# Patient Record
Sex: MALE | Race: WHITE | Employment: UNEMPLOYED | URBAN - METROPOLITAN AREA
[De-identification: names, ages, dates, MRNs, and addresses within clinical notes are randomized per-mention and may not be internally consistent; named-entity substitution may affect disease eponyms.]

---

## 2020-09-24 ENCOUNTER — TELEPHONE (OUTPATIENT)
Dept: INTERNAL MEDICINE CLINIC | Age: 25
End: 2020-09-24

## 2020-09-24 NOTE — TELEPHONE ENCOUNTER
----- Message from Gianluca Courser sent at 9/24/2020  7:09 AM EDT -----  Regarding: Dr. Odalis Damon Message/Vendor Calls    Caller's first and last name:Lala Márquez(mother)      Reason for call:sick visit      Callback required yes/no and why:yes      Best contact number(s):[phone]      Details to clarify the request:Pt's mother requested an appt for today for strep throat. Pt will be  a new pt, and is not scheduled to come in until 12/10/20.       Gianluca Coursesalud

## 2020-12-17 ENCOUNTER — OFFICE VISIT (OUTPATIENT)
Dept: INTERNAL MEDICINE CLINIC | Age: 25
End: 2020-12-17
Payer: COMMERCIAL

## 2020-12-17 VITALS
TEMPERATURE: 98.1 F | OXYGEN SATURATION: 97 % | WEIGHT: 182 LBS | BODY MASS INDEX: 26.05 KG/M2 | DIASTOLIC BLOOD PRESSURE: 78 MMHG | HEIGHT: 70 IN | SYSTOLIC BLOOD PRESSURE: 134 MMHG | RESPIRATION RATE: 18 BRPM | HEART RATE: 85 BPM

## 2020-12-17 DIAGNOSIS — Z00.00 PREVENTATIVE HEALTH CARE: Primary | ICD-10-CM

## 2020-12-17 PROCEDURE — 99385 PREV VISIT NEW AGE 18-39: CPT | Performed by: INTERNAL MEDICINE

## 2020-12-17 NOTE — PROGRESS NOTES
HISTORY OF PRESENT ILLNESS    New patient to my practice, referred to me by his father, Rosibel Mason. .   Prior medical care has been from Dr. Coni Mccoy in pediatrics. his  past medical history was reviewed, discussed, and summarized in the list below. He is graduating from [employer] this month. 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 He is doing well and has no concerns. History of recurrent strep infection infections, requiring antibiotics. Says last episode was 3 months ago. He does have a history of tonsillectomy. Says that he has an allergy to cefuroxime but is unclear if he takes amoxicillin or has an allergy to that as well. It appears he was given Bactrim and azithromycin for infections over the past couple of years. He will check on penicillin allergy status. He does have an EpiPen. No other food allergies. He believes is up-to-date on immunizations. He traveled abroad in the past couple of years and just finished college. Says he had Gardasil vaccine. Says he had STD labs performed for screening at school last month and they were all normal.  Was asymptomatic and has never had an STD. Admits that he drinks 8-10 beers 3 days/week. Review of Systems   All other systems reviewed and are negative, except as noted in HPI      Past Medical and Surgical History  Past Medical History:   Diagnosis Date    Hemiplegic migraine 2013    L tongue/arm/leg pain, numbness.  Hx of streptococcal pharyngitis     Lower back pain     saw ortho Dr. Argenis Regalado 1/2018. transient L leg radicular pain. resolved w PT.      Migraine     rare migraines. one epsidoe of hemiplegic migraine        has a past surgical history that includes hx tonsillectomy. No current outpatient medications on file. No current facility-administered medications for this visit. reports that he has never smoked. He has never used smokeless tobacco. He reports current alcohol use.  He reports that he does not use drugs. family history is not on file. Physical Exam   Nursing note and vitals reviewed. Blood pressure 134/78, pulse 85, temperature 98.1 °F (36.7 °C), temperature source Oral, resp. rate 18, height 5' 10\" (1.778 m), weight 182 lb (82.6 kg), SpO2 97 %. Constitutional: oriented to person, place, and time. No distress. Eyes: Conjunctivae are normal.   HEENT:  No cervical lymphadenopathy. No thyroid nodules or goiter  Cardiovascular: Normal rate. Regular rhythm, no murmurs, rubs. No edema  Pulmonary/Chest: Effort normal. clear to auscultation  Abdominal: soft, non-tender, non-distended  Musculoskeletal:     Neurological: Alert and oriented to person, place. Cranial nerves grossly intact. Normal gait   Skin: No rash noted. Psychiatric: Normal mood and affect. Behavior is normal.     ASSESSMENT and PLAN  Diagnoses and all orders for this visit:    1. Preventative health care  Appears to be a relatively healthy 23year-old. We will get some baseline labs. Get immunization records. Counseled on reduction of alcohol intake. Wished good luck with the applications for new jobs. Counseled on safe sexual practices  -     LIPID PANEL; Future  -     CBC W/O DIFF; Future  -     METABOLIC PANEL, COMPREHENSIVE;  Future    lab results and schedule of future lab studies reviewed with patient  reviewed medications and side effects in detail

## 2020-12-18 LAB
ALBUMIN SERPL-MCNC: 5 G/DL (ref 3.5–5)
ALBUMIN/GLOB SERPL: 1.6 {RATIO} (ref 1.1–2.2)
ALP SERPL-CCNC: 92 U/L (ref 45–117)
ALT SERPL-CCNC: 49 U/L (ref 12–78)
ANION GAP SERPL CALC-SCNC: 6 MMOL/L (ref 5–15)
AST SERPL-CCNC: 23 U/L (ref 15–37)
BILIRUB SERPL-MCNC: 0.5 MG/DL (ref 0.2–1)
BUN SERPL-MCNC: 13 MG/DL (ref 6–20)
BUN/CREAT SERPL: 15 (ref 12–20)
CALCIUM SERPL-MCNC: 9.7 MG/DL (ref 8.5–10.1)
CHLORIDE SERPL-SCNC: 107 MMOL/L (ref 97–108)
CHOLEST SERPL-MCNC: 190 MG/DL
CO2 SERPL-SCNC: 25 MMOL/L (ref 21–32)
CREAT SERPL-MCNC: 0.86 MG/DL (ref 0.7–1.3)
ERYTHROCYTE [DISTWIDTH] IN BLOOD BY AUTOMATED COUNT: 12.8 % (ref 11.5–14.5)
GLOBULIN SER CALC-MCNC: 3.2 G/DL (ref 2–4)
GLUCOSE SERPL-MCNC: 83 MG/DL (ref 65–100)
HCT VFR BLD AUTO: 47.8 % (ref 36.6–50.3)
HDLC SERPL-MCNC: 57 MG/DL
HDLC SERPL: 3.3 {RATIO} (ref 0–5)
HGB BLD-MCNC: 15.8 G/DL (ref 12.1–17)
LDLC SERPL CALC-MCNC: 120.4 MG/DL (ref 0–100)
LIPID PROFILE,FLP: ABNORMAL
MCH RBC QN AUTO: 29.8 PG (ref 26–34)
MCHC RBC AUTO-ENTMCNC: 33.1 G/DL (ref 30–36.5)
MCV RBC AUTO: 90.2 FL (ref 80–99)
NRBC # BLD: 0 K/UL (ref 0–0.01)
NRBC BLD-RTO: 0 PER 100 WBC
PLATELET # BLD AUTO: 222 K/UL (ref 150–400)
PMV BLD AUTO: 10.5 FL (ref 8.9–12.9)
POTASSIUM SERPL-SCNC: 4.8 MMOL/L (ref 3.5–5.1)
PROT SERPL-MCNC: 8.2 G/DL (ref 6.4–8.2)
RBC # BLD AUTO: 5.3 M/UL (ref 4.1–5.7)
SODIUM SERPL-SCNC: 138 MMOL/L (ref 136–145)
TRIGL SERPL-MCNC: 63 MG/DL (ref ?–150)
VLDLC SERPL CALC-MCNC: 12.6 MG/DL
WBC # BLD AUTO: 5.7 K/UL (ref 4.1–11.1)

## 2022-02-24 ENCOUNTER — OFFICE VISIT (OUTPATIENT)
Dept: INTERNAL MEDICINE CLINIC | Age: 27
End: 2022-02-24
Payer: COMMERCIAL

## 2022-02-24 VITALS
HEART RATE: 84 BPM | TEMPERATURE: 98.6 F | OXYGEN SATURATION: 98 % | SYSTOLIC BLOOD PRESSURE: 112 MMHG | HEIGHT: 70 IN | WEIGHT: 199 LBS | RESPIRATION RATE: 16 BRPM | DIASTOLIC BLOOD PRESSURE: 73 MMHG | BODY MASS INDEX: 28.49 KG/M2

## 2022-02-24 DIAGNOSIS — M65.9 FLEXOR TENOSYNOVITIS OF THUMB: Primary | ICD-10-CM

## 2022-02-24 PROCEDURE — 99213 OFFICE O/P EST LOW 20 MIN: CPT | Performed by: INTERNAL MEDICINE

## 2022-02-24 RX ORDER — NAPROXEN SODIUM 220 MG
440 TABLET ORAL 2 TIMES DAILY WITH MEALS
Qty: 56 TABLET | Refills: 0
Start: 2022-02-24 | End: 2022-03-10

## 2022-02-24 NOTE — PROGRESS NOTES
HISTORY OF PRESENT ILLNESS    Chief Complaint   Patient presents with    Hand Pain     Left - when bending thumb - about 1 month - unaware of any causes. Doing well. 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 Lives with his girlfriend of 3 years. Presents with pain of left palm at base of thumb for 30 days. Associated symptoms include: pain with flexion of thumb. No injury. Worse w weight lifting and ry . No change in activity. No prior injury. No tx tried. Still able to do everything. Review of Systems   All other systems reviewed and are negative, except as noted in HPI    Past Medical and Surgical History   has a past medical history of Hemiplegic migraine (2013), streptococcal pharyngitis, Lower back pain, and Migraine. has a past surgical history that includes hx tonsillectomy. reports that he has never smoked. He has never used smokeless tobacco. He reports current alcohol use. He reports that he does not use drugs. family history is not on file. Physical Exam   Nursing note and vitals reviewed. Blood pressure 112/73, pulse 84, temperature 98.6 °F (37 °C), temperature source Oral, resp. rate 16, height 5' 10\" (1.778 m), weight 199 lb (90.3 kg), SpO2 98 %. Constitutional: In no distress. Eyes: Conjunctivae are normal.  HEENT:  No LAD or thyromegaly   Cardiovascular: Normal rate. regular rhythm. No murmurs  No edema  Pulmonary/Chest: Effort normal.   Musculoskeletal:  no edema. Left thumb full range of motion. There are some tenderness at the base of his thenar eminence. No palpable abnormalities. No cysts. No erythema or warmth. Psychiatric: Normal mood and affect. Behavior is normal.     ASSESSMENT and PLAN  1. Flexor tenosynovitis of thumb  Thenar muscle. Recommend Aleve 440 mg twice daily for 7 days and purchase a thumb brace to wear most of the time for the next 2 weeks.   Try to avoid repetitive activities or severe pressure on the area.  Consider referral to orthopedics for injection back likely will be unnecessary. Reassured. I do not see any indication for x-ray at this point. lab results and schedule of future lab studies reviewed with patient  reviewed medications and side effects in detail    Return to clinic for further evaluation if new symptoms develop or if current symptoms worsen or fail to resolve. There are no Patient Instructions on file for this visit.

## 2022-03-31 ENCOUNTER — TELEPHONE (OUTPATIENT)
Dept: INTERNAL MEDICINE CLINIC | Age: 27
End: 2022-03-31

## 2022-03-31 NOTE — TELEPHONE ENCOUNTER
Spoke with patient and he continues to voice concerns with his urinary frequency. Patient scheduled appt for 4/6/22 with Dr. Jie Daniels for evaluation and treatment. Grateful for the call.

## 2022-03-31 NOTE — TELEPHONE ENCOUNTER
----- Message from Vikram Winters sent at 3/31/2022  9:01 AM EDT -----  Regarding: Peeing Too Much  So I tracked exactly how much I drink and peed yesterday.     Morning  Drinks: 3 10 oz  Peed: 4     Afternoon  Drinks: 1 16oz 1 16.9oz  Peed: 3    Evening  Drinks: 1 30oz 2 12oz  Peed: 3    Night  Drinks: 1 20oz  Peed 2    Early Morning  Peed: 2

## 2022-04-06 ENCOUNTER — OFFICE VISIT (OUTPATIENT)
Dept: INTERNAL MEDICINE CLINIC | Age: 27
End: 2022-04-06
Payer: COMMERCIAL

## 2022-04-06 VITALS
OXYGEN SATURATION: 98 % | HEIGHT: 70 IN | DIASTOLIC BLOOD PRESSURE: 73 MMHG | TEMPERATURE: 98.1 F | BODY MASS INDEX: 28.95 KG/M2 | WEIGHT: 202.2 LBS | HEART RATE: 80 BPM | RESPIRATION RATE: 16 BRPM | SYSTOLIC BLOOD PRESSURE: 121 MMHG

## 2022-04-06 DIAGNOSIS — R35.0 URINARY FREQUENCY: Primary | ICD-10-CM

## 2022-04-06 DIAGNOSIS — J03.01 RECURRENT STREPTOCOCCAL TONSILLITIS: ICD-10-CM

## 2022-04-06 LAB
BILIRUB UR QL STRIP: NEGATIVE
GLUCOSE UR-MCNC: NEGATIVE MG/DL
KETONES P FAST UR STRIP-MCNC: NEGATIVE MG/DL
PH UR STRIP: 6.5 [PH] (ref 4.6–8)
PROT UR QL STRIP: NEGATIVE
SP GR UR STRIP: 1.02 (ref 1–1.03)
UA UROBILINOGEN AMB POC: NORMAL (ref 0.2–1)
URINALYSIS CLARITY POC: CLEAR
URINALYSIS COLOR POC: YELLOW
URINE BLOOD POC: NEGATIVE
URINE LEUKOCYTES POC: NEGATIVE
URINE NITRITES POC: NEGATIVE

## 2022-04-06 PROCEDURE — 99213 OFFICE O/P EST LOW 20 MIN: CPT | Performed by: INTERNAL MEDICINE

## 2022-04-06 PROCEDURE — 81003 URINALYSIS AUTO W/O SCOPE: CPT | Performed by: INTERNAL MEDICINE

## 2022-04-06 NOTE — PROGRESS NOTES
HISTORY OF PRESENT ILLNESS    Chief Complaint   Patient presents with    Urinary Frequency       Presents for follow-up. Doing fairly well. Working in [employer]. Reports frequent urination. This is chronic, noted years ago. 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 He has to use the bathroom more than anyone else. Notes pressure to urinate. No incontinence. Urinates about once per night overnight. Feels he empties bladder. Urinary stream is full  Rare dysuria. No discharge. Review of Systems   All other systems reviewed and are negative, except as noted in HPI    Past Medical and Surgical History   has a past medical history of Hemiplegic migraine (2013), streptococcal pharyngitis, Lower back pain, and Migraine. has a past surgical history that includes hx tonsillectomy. reports that he has never smoked. He has never used smokeless tobacco. He reports current alcohol use. He reports that he does not use drugs. family history is not on file. Physical Exam   Nursing note and vitals reviewed. Blood pressure 121/73, pulse 80, temperature 98.1 °F (36.7 °C), temperature source Oral, resp. rate 16, height 5' 10\" (1.778 m), weight 202 lb 3.2 oz (91.7 kg), SpO2 98 %. Constitutional:  No distress. Eyes: Conjunctivae are normal.   Ears:  Hearing grossly intact  Cardiovascular: Normal rate. regular rhythm, no murmurs or gallops  No edema  Pulmonary/Chest: Effort normal.   CTAB  Musculoskeletal: moves all 4 extremities   Neurological: Alert and oriented to person, place, and time. Skin: No visible rash noted. Psychiatric: Normal mood and affect. Behavior is normal.     Assessment and Plan    Diagnoses and all orders for this visit:    1. Urinary frequency  This does sound relatively benign and has chronic. I suspect he just has a limited bladder capacity. We will check for any evidence of diabetes, hyponatremia and get a baseline PSA. Urinalysis today is benign.   Try to limit spicy foods, caffeine, tea, coffee, alcohol and excessive fluids.  -     AMB POC URINALYSIS DIP STICK AUTO W/O MICRO  -     METABOLIC PANEL, BASIC; Future  -     PSA W/ REFLX FREE PSA; Future  -     REFERRAL TO UROLOGY    2. Recurrent streptococcal pharyngitis  Reports that he has strep pharyngitis a couple of times per years. History of tonsil resection. Can refer to ENT if you would like, but nothing therapy especially helpful right now. Let me know if symptoms recur. lab results and schedule of future lab studies reviewed with patient  reviewed medications and side effects in detail    Return to clinic for further evaluation if new symptoms develop        No current outpatient medications on file. No current facility-administered medications for this visit.

## 2022-04-07 LAB
BUN SERPL-MCNC: 15 MG/DL (ref 6–20)
BUN/CREAT SERPL: 18 (ref 9–20)
CALCIUM SERPL-MCNC: 9.5 MG/DL (ref 8.7–10.2)
CHLORIDE SERPL-SCNC: 103 MMOL/L (ref 96–106)
CO2 SERPL-SCNC: 21 MMOL/L (ref 20–29)
CREAT SERPL-MCNC: 0.85 MG/DL (ref 0.76–1.27)
EGFR: 124 ML/MIN/1.73
GLUCOSE SERPL-MCNC: 87 MG/DL (ref 65–99)
POTASSIUM SERPL-SCNC: 4.7 MMOL/L (ref 3.5–5.2)
PSA SERPL-MCNC: 0.5 NG/ML (ref 0–4)
REFLEX CRITERIA: NORMAL
SODIUM SERPL-SCNC: 140 MMOL/L (ref 134–144)

## 2023-03-02 RX ORDER — DOXYCYCLINE 100 MG/1
100 CAPSULE ORAL 2 TIMES DAILY
Qty: 14 CAPSULE | Refills: 0 | Status: SHIPPED | OUTPATIENT
Start: 2023-03-02

## 2023-03-30 ENCOUNTER — OFFICE VISIT (OUTPATIENT)
Dept: INTERNAL MEDICINE CLINIC | Age: 28
End: 2023-03-30
Payer: COMMERCIAL

## 2023-03-30 VITALS
SYSTOLIC BLOOD PRESSURE: 107 MMHG | OXYGEN SATURATION: 97 % | TEMPERATURE: 98.1 F | HEART RATE: 75 BPM | WEIGHT: 196.7 LBS | DIASTOLIC BLOOD PRESSURE: 79 MMHG | HEIGHT: 70 IN | RESPIRATION RATE: 16 BRPM | BODY MASS INDEX: 28.16 KG/M2

## 2023-03-30 DIAGNOSIS — J02.0 STREPTOCOCCAL PHARYNGITIS: Primary | ICD-10-CM

## 2023-03-30 LAB
S PYO AG THROAT QL: POSITIVE
VALID INTERNAL CONTROL?: YES

## 2023-03-30 PROCEDURE — 87880 STREP A ASSAY W/OPTIC: CPT | Performed by: NURSE PRACTITIONER

## 2023-03-30 PROCEDURE — 99203 OFFICE O/P NEW LOW 30 MIN: CPT | Performed by: NURSE PRACTITIONER

## 2023-03-30 RX ORDER — ONDANSETRON 4 MG/1
4 TABLET, FILM COATED ORAL
Qty: 21 TABLET | Refills: 0 | Status: SHIPPED | OUTPATIENT
Start: 2023-03-30 | End: 2023-04-06

## 2023-03-30 RX ORDER — AMOXICILLIN AND CLAVULANATE POTASSIUM 500; 125 MG/1; MG/1
1 TABLET, FILM COATED ORAL 3 TIMES DAILY
Qty: 30 TABLET | Refills: 0 | Status: SHIPPED | OUTPATIENT
Start: 2023-03-30 | End: 2023-04-09

## 2023-03-30 NOTE — PROGRESS NOTES
Assessment and Plan     1. Streptococcal pharyngitis: Positive in office strep test, results discussed with pt. Treatment started, mode of use and possible side effects discussed. Rx for ondansetron as needed for nausea/vomiting given. Hydration  and rest discussed. Dx transmission prevention discussed. Pt verbalized understanding  -     AMB POC RAPID STREP A  -     amoxicillin-clavulanate (AUGMENTIN) 500-125 mg per tablet; Take 1 Tablet by mouth three (3) times daily for 10 days. , Normal, Disp-30 Tablet, R-0  -     ondansetron hcl (ZOFRAN) 4 mg tablet; Take 1 Tablet by mouth every eight (8) hours as needed for Nausea or Vomiting for up to 7 days. , Normal, Disp-21 Tablet, R-0    Tests Preformed During Visit:    Results for orders placed or performed in visit on 03/30/23   AMB POC RAPID STREP A   Result Value Ref Range    VALID INTERNAL CONTROL POC Yes     Group A Strep Ag Positive Negative       Benefits, risks, possible drug interactions, and side effects of all new medications were reviewed with the patient. Pt verbalized understanding. Return to clinic:    Follow-up and Dispositions    Return if symptoms worsen or fail to improve. An electronic signature was used to authenticate this note. Collin Wynn, [address] [facility]  [facility] [id number]  3/30/2023      No future appointments. History of Present Illness   Chief Complaint     Hawk Sharp is a 24 y.o. male here for had concerns including Sore Throat (Sore throat; started around 3 days ago; nausea). Sore throat: Pt presented today with complaints of sore throat and nausea, onset 3 days ago, symptoms worsening  since yesterday with body aches. Denies vomiting, fever, changes in BM. Fiance with similar symptoms. Pt has hx of strep infection. Surgical hx includes tonsillectomy.      Review of Systems  Review of Systems   Constitutional:  Positive for malaise/fatigue (slight). Negative for chills and fever. HENT:  Positive for sore throat. Negative for congestion, ear discharge, ear pain, nosebleeds and sinus pain. Respiratory: Negative. Negative for cough, sputum production, shortness of breath and wheezing. Cardiovascular: Negative. Negative for chest pain. Gastrointestinal: Negative. Negative for abdominal pain, constipation, diarrhea, nausea and vomiting. Genitourinary: Negative. Negative for dysuria, frequency, hematuria and urgency. Musculoskeletal: Negative. Negative for myalgias. Neurological: Negative. Negative for dizziness and headaches. Psychiatric/Behavioral: Negative. Negative for depression, substance abuse and suicidal ideas. The patient is not nervous/anxious. Past Medical History     Allergies   Allergen Reactions    Cefuroxime Anaphylaxis and Swelling        Current Outpatient Medications   Medication Sig    amoxicillin-clavulanate (AUGMENTIN) 500-125 mg per tablet Take 1 Tablet by mouth three (3) times daily for 10 days. ondansetron hcl (ZOFRAN) 4 mg tablet Take 1 Tablet by mouth every eight (8) hours as needed for Nausea or Vomiting for up to 7 days. No current facility-administered medications for this visit. Patient Active Problem List   Diagnosis Code    Migraine G43.909    Lower back pain M54.50     Past Surgical History:   Procedure Laterality Date    HX TONSILLECTOMY        Social History     Tobacco Use    Smoking status: Never    Smokeless tobacco: Never   Substance Use Topics    Alcohol use: Yes      History reviewed. No pertinent family history. Physical Exam   Vitals:       Visit Vitals  /79 (BP 1 Location: Left upper arm, BP Patient Position: Sitting, BP Cuff Size: Adult)   Pulse 75   Temp 98.1 °F (36.7 °C) (Oral)   Resp 16   Ht 5' 10\" (1.778 m)   Wt 196 lb 11.2 oz (89.2 kg)   SpO2 97%   BMI 28.22 kg/m²        Physical Exam  Vitals reviewed.    Constitutional: General: He is not in acute distress. Appearance: He is ill-appearing. He is not toxic-appearing or diaphoretic. HENT:      Right Ear: Tympanic membrane, ear canal and external ear normal. There is no impacted cerumen. Left Ear: Tympanic membrane, ear canal and external ear normal. There is no impacted cerumen. Nose: Nose normal. No congestion or rhinorrhea. Mouth/Throat:      Lips: Pink. No lesions. Mouth: Mucous membranes are moist. No oral lesions or angioedema. Tongue: No lesions. Tongue does not deviate from midline. Pharynx: Posterior oropharyngeal erythema present. No pharyngeal swelling, oropharyngeal exudate or uvula swelling. Eyes:      General:         Right eye: No discharge. Left eye: No discharge. Conjunctiva/sclera: Conjunctivae normal.   Neck:      Vascular: No carotid bruit. Cardiovascular:      Rate and Rhythm: Normal rate and regular rhythm. Pulses: Normal pulses. Heart sounds: Normal heart sounds. Pulmonary:      Effort: Pulmonary effort is normal. No respiratory distress. Breath sounds: Normal breath sounds. No wheezing or rales. Abdominal:      General: Bowel sounds are normal. There is no distension. Palpations: Abdomen is soft. There is no mass. Tenderness: There is no abdominal tenderness. There is no guarding. Hernia: No hernia is present. Musculoskeletal:      Cervical back: No rigidity or tenderness. Lymphadenopathy:      Cervical: Cervical adenopathy present. Skin:     General: Skin is warm and dry. Neurological:      Mental Status: He is alert and oriented to person, place, and time. Mental status is at baseline.    Psychiatric:         Mood and Affect: Mood normal.         Behavior: Behavior normal.

## 2023-08-28 ENCOUNTER — OFFICE VISIT (OUTPATIENT)
Age: 28
End: 2023-08-28

## 2023-08-28 VITALS
DIASTOLIC BLOOD PRESSURE: 93 MMHG | SYSTOLIC BLOOD PRESSURE: 126 MMHG | WEIGHT: 193 LBS | OXYGEN SATURATION: 97 % | TEMPERATURE: 98.2 F | HEIGHT: 71 IN | HEART RATE: 61 BPM | RESPIRATION RATE: 18 BRPM | BODY MASS INDEX: 27.02 KG/M2

## 2023-08-28 DIAGNOSIS — H66.93 BILATERAL OTITIS MEDIA, UNSPECIFIED OTITIS MEDIA TYPE: Primary | ICD-10-CM

## 2023-08-28 RX ORDER — AMOXICILLIN 500 MG/1
500 CAPSULE ORAL 2 TIMES DAILY
Qty: 20 CAPSULE | Refills: 0 | Status: SHIPPED | OUTPATIENT
Start: 2023-08-28 | End: 2023-09-07

## 2023-08-28 ASSESSMENT — ENCOUNTER SYMPTOMS
RESPIRATORY NEGATIVE: 1
GASTROINTESTINAL NEGATIVE: 1
ALLERGIC/IMMUNOLOGIC NEGATIVE: 1
EYES NEGATIVE: 1

## 2023-08-28 NOTE — PROGRESS NOTES
2023   Raven Espinosa (: 1995) is a 34 y.o. male, New patient, here for evaluation of the following chief complaint(s):  Otalgia (Pt thinks he has ear infection, started this morning, painful feels water.)     ASSESSMENT/PLAN:  Below is the assessment and plan developed based on review of pertinent history, physical exam, labs, studies, and medications. 1. Bilateral otitis media, unspecified otitis media type  -     amoxicillin (AMOXIL) 500 MG capsule; Take 1 capsule by mouth 2 times daily for 10 days, Disp-20 capsule, R-0Normal         Handout given with care instructions  2. OTC for symptom management. Increase fluid intake, ensure adequate nutritional intake. 3. Follow up with PCP as needed. 4. Go to ED with development of any acute symptoms. Follow up:  Return if symptoms worsen or fail to improve. Follow up immediately for any new, worsening or changes or if symptoms are not improving over the next 5-7 days. SUBJECTIVE/OBJECTIVE:    Otalgia        Diagnoses and all orders for this visit:  Bilateral otitis media, unspecified otitis media type  Raven Espinosa is a 34 y.o. male who complains of bilateral ear pain for 1 days. He denies a history of fevers and denies a history of asthma. Patient denies smoke cigarettes. Patient also noted that OTC remedies did not help. PROGRESSION: STABLE  SYMPTOMS        No results found for any visits on 23. No results found for this visit on 23. XR Results (most recent):  [id number]@         Review of Systems   Constitutional: Negative. HENT:  Positive for ear pain. Eyes: Negative. Respiratory: Negative. Cardiovascular: Negative. Gastrointestinal: Negative. Endocrine: Negative. Genitourinary: Negative. Musculoskeletal: Negative. Skin: Negative. Allergic/Immunologic: Negative. Neurological: Negative. Hematological: Negative. Psychiatric/Behavioral: Negative.      All other systems

## 2023-11-28 ENCOUNTER — OFFICE VISIT (OUTPATIENT)
Age: 28
End: 2023-11-28

## 2023-11-28 VITALS
SYSTOLIC BLOOD PRESSURE: 124 MMHG | TEMPERATURE: 98.3 F | HEIGHT: 71 IN | OXYGEN SATURATION: 98 % | WEIGHT: 188 LBS | BODY MASS INDEX: 26.32 KG/M2 | RESPIRATION RATE: 18 BRPM | HEART RATE: 70 BPM | DIASTOLIC BLOOD PRESSURE: 80 MMHG

## 2023-11-28 DIAGNOSIS — J02.9 ACUTE VIRAL PHARYNGITIS: Primary | ICD-10-CM

## 2023-11-28 DIAGNOSIS — B37.2 INTERTRIGO OF GENITOCRURAL REGION DUE TO CANDIDA SPECIES: ICD-10-CM

## 2023-11-28 LAB
STREP PYOGENES DNA, POC: NEGATIVE
VALID INTERNAL CONTROL, POC: YES

## 2023-11-28 RX ORDER — KETOCONAZOLE 20 MG/G
CREAM TOPICAL
Qty: 30 G | Refills: 1 | Status: SHIPPED | OUTPATIENT
Start: 2023-11-28

## 2023-11-28 NOTE — PROGRESS NOTES
Head: Normocephalic and atraumatic. Right Ear: Tympanic membrane, ear canal and external ear normal.      Left Ear: Tympanic membrane, ear canal and external ear normal.      Nose: Congestion present. No nasal tenderness, mucosal edema or rhinorrhea. Right Sinus: No maxillary sinus tenderness or frontal sinus tenderness. Left Sinus: No maxillary sinus tenderness or frontal sinus tenderness. Mouth/Throat:      Lips: Pink. Mouth: Mucous membranes are moist.      Pharynx: Oropharynx is clear. Uvula midline. Posterior oropharyngeal erythema (Mild with PND) present. No pharyngeal swelling, oropharyngeal exudate or uvula swelling. Tonsils: No tonsillar exudate or tonsillar abscesses. Eyes:      Conjunctiva/sclera: Conjunctivae normal.   Cardiovascular:      Rate and Rhythm: Normal rate and regular rhythm. Heart sounds: Normal heart sounds. Pulmonary:      Effort: Pulmonary effort is normal.      Breath sounds: Normal breath sounds. Musculoskeletal:      Cervical back: Normal range of motion and neck supple. Lymphadenopathy:      Cervical: No cervical adenopathy. Skin:     General: Skin is warm and dry. Findings: Erythema and rash present. Neurological:      Mental Status: He is alert and oriented to person, place, and time. Psychiatric:         Mood and Affect: Mood normal.         Behavior: Behavior normal.       Results for orders placed or performed in visit on 11/28/23   AMB POC STREP GO A DIRECT, DNA PROBE   Result Value Ref Range    Valid Internal Control, POC Yes     Strep pyogenes DNA, POC Negative Negative         An electronic signature was used to authenticate this note.     --Tianna Ruano, DENISSE - CNP

## 2024-02-16 DIAGNOSIS — B37.2 INTERTRIGO OF GENITOCRURAL REGION DUE TO CANDIDA SPECIES: ICD-10-CM

## 2024-02-18 RX ORDER — KETOCONAZOLE 20 MG/G
CREAM TOPICAL
Qty: 30 G | Refills: 1 | OUTPATIENT
Start: 2024-02-18

## 2024-02-21 ENCOUNTER — E-VISIT (OUTPATIENT)
Age: 29
End: 2024-02-21

## 2024-02-21 DIAGNOSIS — K12.0 CANKER SORES ORAL: Primary | ICD-10-CM

## 2024-02-21 PROCEDURE — 99421 OL DIG E/M SVC 5-10 MIN: CPT | Performed by: INTERNAL MEDICINE

## 2024-02-21 RX ORDER — TRIAMCINOLONE ACETONIDE 0.1 %
PASTE (GRAM) DENTAL
Qty: 5 G | Refills: 2 | Status: SHIPPED | OUTPATIENT
Start: 2024-02-21 | End: 2024-02-28

## 2024-02-21 NOTE — PROGRESS NOTES
Fan Jones (1995) initiated an asynchronous digital communication through [employer].    HPI: per patient questionnaire     Exam: not applicable    Diagnoses and all orders for this visit:  Diagnoses and all orders for this visit:    Canker sores oral  -     triamcinolone acetonide (KENALOG) 0.1 % paste; Apply to canker sore 2 times daily.for 7 days          Time: EV1 - 5-10 minutes were spent on the digital evaluation and management of this patient.    Beto Reyez MD

## 2024-03-26 ENCOUNTER — OFFICE VISIT (OUTPATIENT)
Age: 29
End: 2024-03-26

## 2024-03-26 VITALS
BODY MASS INDEX: 26.18 KG/M2 | WEIGHT: 187 LBS | SYSTOLIC BLOOD PRESSURE: 126 MMHG | OXYGEN SATURATION: 98 % | HEART RATE: 62 BPM | DIASTOLIC BLOOD PRESSURE: 74 MMHG | TEMPERATURE: 98.1 F | HEIGHT: 71 IN

## 2024-03-26 DIAGNOSIS — J45.20 MILD INTERMITTENT ASTHMA WITHOUT COMPLICATION: Primary | ICD-10-CM

## 2024-03-26 DIAGNOSIS — R05.9 COUGH, UNSPECIFIED TYPE: ICD-10-CM

## 2024-03-26 LAB
Lab: NORMAL
PERFORMING INSTRUMENT: NORMAL
QC PASS/FAIL: NORMAL
SARS-COV-2, POC: NORMAL

## 2024-03-26 RX ORDER — ALBUTEROL SULFATE 90 UG/1
2 AEROSOL, METERED RESPIRATORY (INHALATION) 4 TIMES DAILY PRN
Qty: 18 G | Refills: 0 | Status: SHIPPED | OUTPATIENT
Start: 2024-03-26

## 2024-03-26 RX ORDER — METHYLPREDNISOLONE 4 MG/1
4 TABLET ORAL SEE ADMIN INSTRUCTIONS
Qty: 1 KIT | Refills: 0 | Status: SHIPPED | OUTPATIENT
Start: 2024-03-26

## 2024-03-26 RX ORDER — KETOCONAZOLE 20 MG/G
2 CREAM TOPICAL DAILY PRN
COMMUNITY
Start: 2024-02-21

## 2024-03-26 RX ORDER — IPRATROPIUM BROMIDE AND ALBUTEROL SULFATE 2.5; .5 MG/3ML; MG/3ML
1 SOLUTION RESPIRATORY (INHALATION) ONCE
Status: COMPLETED | OUTPATIENT
Start: 2024-03-26 | End: 2024-03-26

## 2024-03-26 RX ADMIN — IPRATROPIUM BROMIDE AND ALBUTEROL SULFATE 1 DOSE: 2.5; .5 SOLUTION RESPIRATORY (INHALATION) at 10:08

## 2024-03-26 ASSESSMENT — ENCOUNTER SYMPTOMS
ALLERGIC/IMMUNOLOGIC NEGATIVE: 1
RHINORRHEA: 1
WHEEZING: 1
COUGH: 1
GASTROINTESTINAL NEGATIVE: 1
SHORTNESS OF BREATH: 0
EYES NEGATIVE: 1
CHEST TIGHTNESS: 0

## 2024-03-26 NOTE — PROGRESS NOTES
3/26/2024   Fan Jones (: 1995) is a 28 y.o. male, New patient, here for evaluation of the following chief complaint(s):  Shortness of Breath (4-5 days of sx. Always feels out of breath, chest tightness. Denies cough, mucus, or pain when breathing. Took OTC Flonase. Only change is has been running more. It is consistent. Can catch breath, just always feels like he's slightly out of O2. )     ASSESSMENT/PLAN:  Below is the assessment and plan developed based on review of pertinent history, physical exam, labs, studies, and medications.  1. Mild intermittent asthma without complication  -     ipratropium 0.5 mg-albuterol 2.5 mg (DUONEB) nebulizer solution 1 Dose; 1 Dose, Inhalation, ONCE, 1 dose, On Tue 3/26/24 at 1015Initiate RT Bronchodilator Protocol: No  -     methylPREDNISolone (MEDROL, ALIREZA,) 4 MG tablet; Take 1 tablet by mouth See Admin Instructions Take by mouth., Disp-1 kit, R-0Normal  -     albuterol sulfate HFA (VENTOLIN HFA) 108 (90 Base) MCG/ACT inhaler; Inhale 2 puffs into the lungs 4 times daily as needed for Wheezing, Disp-18 g, R-0Normal  2. Cough, unspecified type  -     POCT COVID-19, Antigen  -     XR CHEST PA/LAT; Future         Handout given with care instructions  2. OTC for symptom management. Increase fluid intake, ensure adequate nutritional intake.  3. Follow up with PCP as needed.  4. Go to ED with development of any acute symptoms.     Follow up:  Return if symptoms worsen or fail to improve.  Follow up immediately for any new, worsening or changes or if symptoms are not improving over the next 5-7 days.     SUBJECTIVE/OBJECTIVE:    Shortness of Breath  Associated symptoms include rhinorrhea and wheezing.        Diagnoses and all orders for this visit:  Mild intermittent asthma without complication  Shortness of Breath (4-5 days of sx. Always feels out of breath, chest tightness. Denies cough, mucus, or pain when breathing. Took OTC Flonase. Only change is has been running more.

## 2024-04-15 ENCOUNTER — PATIENT MESSAGE (OUTPATIENT)
Age: 29
End: 2024-04-15

## 2024-04-16 RX ORDER — KETOCONAZOLE 20 MG/G
CREAM TOPICAL
Qty: 30 G | Refills: 0 | Status: SHIPPED | OUTPATIENT
Start: 2024-04-16

## 2024-04-16 NOTE — TELEPHONE ENCOUNTER
From: Fan Jones  To: Anh Krishna  Sent: 4/15/2024 4:41 PM EDT  Subject: ketoconazole 2 % cream    Can I get a refill of the ketoconazole 2 % cream?

## 2024-04-22 DIAGNOSIS — J45.20 MILD INTERMITTENT ASTHMA WITHOUT COMPLICATION: ICD-10-CM

## 2024-04-23 RX ORDER — ALBUTEROL SULFATE 90 UG/1
2 AEROSOL, METERED RESPIRATORY (INHALATION) 4 TIMES DAILY PRN
Qty: 8.5 EACH | OUTPATIENT
Start: 2024-04-23

## 2024-06-05 ENCOUNTER — OFFICE VISIT (OUTPATIENT)
Age: 29
End: 2024-06-05
Payer: COMMERCIAL

## 2024-06-05 VITALS
OXYGEN SATURATION: 99 % | HEIGHT: 71 IN | DIASTOLIC BLOOD PRESSURE: 81 MMHG | TEMPERATURE: 98.1 F | BODY MASS INDEX: 23.83 KG/M2 | WEIGHT: 170.2 LBS | RESPIRATION RATE: 19 BRPM | SYSTOLIC BLOOD PRESSURE: 127 MMHG | HEART RATE: 72 BPM

## 2024-06-05 DIAGNOSIS — Z11.4 SCREENING FOR HIV WITHOUT PRESENCE OF RISK FACTORS: ICD-10-CM

## 2024-06-05 DIAGNOSIS — Z00.00 PREVENTATIVE HEALTH CARE: ICD-10-CM

## 2024-06-05 DIAGNOSIS — F41.1 GENERALIZED ANXIETY DISORDER WITH PANIC ATTACKS: ICD-10-CM

## 2024-06-05 DIAGNOSIS — Z00.00 PREVENTATIVE HEALTH CARE: Primary | ICD-10-CM

## 2024-06-05 DIAGNOSIS — B37.2 CANDIDIASIS, INTERTRIGINOUS: ICD-10-CM

## 2024-06-05 DIAGNOSIS — F41.0 GENERALIZED ANXIETY DISORDER WITH PANIC ATTACKS: ICD-10-CM

## 2024-06-05 PROCEDURE — 99395 PREV VISIT EST AGE 18-39: CPT | Performed by: INTERNAL MEDICINE

## 2024-06-05 RX ORDER — NYSTATIN 100000 [USP'U]/G
POWDER TOPICAL
Qty: 60 G | Refills: 2 | Status: SHIPPED | OUTPATIENT
Start: 2024-06-05

## 2024-06-05 RX ORDER — KETOCONAZOLE 20 MG/G
CREAM TOPICAL
Qty: 30 G | Refills: 2 | Status: SHIPPED | OUTPATIENT
Start: 2024-06-05

## 2024-06-05 SDOH — ECONOMIC STABILITY: FOOD INSECURITY: WITHIN THE PAST 12 MONTHS, YOU WORRIED THAT YOUR FOOD WOULD RUN OUT BEFORE YOU GOT MONEY TO BUY MORE.: NEVER TRUE

## 2024-06-05 SDOH — ECONOMIC STABILITY: FOOD INSECURITY: WITHIN THE PAST 12 MONTHS, THE FOOD YOU BOUGHT JUST DIDN'T LAST AND YOU DIDN'T HAVE MONEY TO GET MORE.: NEVER TRUE

## 2024-06-05 SDOH — ECONOMIC STABILITY: HOUSING INSECURITY
IN THE LAST 12 MONTHS, WAS THERE A TIME WHEN YOU DID NOT HAVE A STEADY PLACE TO SLEEP OR SLEPT IN A SHELTER (INCLUDING NOW)?: NO

## 2024-06-05 SDOH — ECONOMIC STABILITY: INCOME INSECURITY: HOW HARD IS IT FOR YOU TO PAY FOR THE VERY BASICS LIKE FOOD, HOUSING, MEDICAL CARE, AND HEATING?: NOT HARD AT ALL

## 2024-06-05 ASSESSMENT — PATIENT HEALTH QUESTIONNAIRE - PHQ9
SUM OF ALL RESPONSES TO PHQ QUESTIONS 1-9: 0
SUM OF ALL RESPONSES TO PHQ QUESTIONS 1-9: 0
1. LITTLE INTEREST OR PLEASURE IN DOING THINGS: NOT AT ALL
SUM OF ALL RESPONSES TO PHQ QUESTIONS 1-9: 0
SUM OF ALL RESPONSES TO PHQ QUESTIONS 1-9: 0
SUM OF ALL RESPONSES TO PHQ9 QUESTIONS 1 & 2: 0
2. FEELING DOWN, DEPRESSED OR HOPELESS: NOT AT ALL

## 2024-06-05 NOTE — PROGRESS NOTES
\"Have you been to the ER, urgent care clinic since your last visit?  Hospitalized since your last visit?\"    NO    “Have you seen or consulted any other health care providers outside of [facility] since your last visit?”    NO          Click Here for Release of Records Request

## 2024-06-05 NOTE — PROGRESS NOTES
Fan Jones is a 28 y.o. male  Presenting for his annual checkup and health maintenance review and follow-up     Leia 10/2023.  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    Lifestyle: generally follows a low fat low cholesterol diet    Health Maintenance   Topic Date Due    HIV screen  Never done    COVID-19 Vaccine (3 - 2023-24 season) 09/01/2023    Depression Screen  03/30/2024    Flu vaccine (Season Ended) 08/01/2024    DTaP/Tdap/Td vaccine (3 - Td or Tdap) 05/25/2025    Hepatitis A vaccine  Completed    Hepatitis B vaccine  Completed    HPV vaccine  Completed    Varicella vaccine  Completed    Meningococcal (ACWY) vaccine  Completed    Hib vaccine  Aged Out    Polio vaccine  Aged Out    Pneumococcal 0-64 years Vaccine  Aged Out    Hepatitis C screen  Discontinued     Health Maintenance reviewed  Last digital rectal exam:  none  Lab Results   Component Value Date/Time    PSA 0.5 04/06/2022 09:41 AM       Vaccinations reviewed  Immunization History   Administered Date(s) Administered    COVID-19, MODERNA BLUE border, Primary or Immunocompromised, (age 12y+), IM, 100 mcg/0.5mL 04/22/2021, 05/20/2021    HPV (Human Papilloma Virus)Vaccine 10/16/2013, 01/14/2014, 07/01/2014    Hepatitis A Vaccine 07/02/2007, 08/06/2008    Hepatitis B vaccine 1995, 1995, 03/27/1998    MMR, PRIORIX, M-M-R II, (age 12m+), SC, 0.5mL 09/17/1998, 07/26/2002    Meningococcal, MCV4, Unspecifd Conjugate (A,C,Y and W-135) 08/26/2009, 08/25/2015    TDaP, ADACEL (age 10y-64y), BOOSTRIX (age 10y+), IM, 0.5mL 1997, 1997, 01/14/1998, 12/17/1998, 07/26/2002, 08/06/2008, 05/25/2015    Varicella, VARIVAX, (age 12m+), SC, 0.5mL 07/22/1998, 07/02/2007       Past Medical History:   Diagnosis Date    Generalized anxiety disorder with panic attacks 2024    seeing counselor 2024  +social anxiety    Hemiplegic migraine 2013    L tongue/arm/leg pain, numbness.      Hx of streptococcal pharyngitis     Lower back pain     saw ortho Dr. Barbosa

## 2024-06-06 LAB
ALBUMIN SERPL-MCNC: 5.1 G/DL (ref 4.3–5.2)
ALBUMIN/GLOB SERPL: 2.3 {RATIO} (ref 1.2–2.2)
ALP SERPL-CCNC: 79 IU/L (ref 44–121)
ALT SERPL-CCNC: 22 IU/L (ref 0–44)
AST SERPL-CCNC: 21 IU/L (ref 0–40)
BILIRUB SERPL-MCNC: 0.4 MG/DL (ref 0–1.2)
BUN SERPL-MCNC: 10 MG/DL (ref 6–20)
BUN/CREAT SERPL: 13 (ref 9–20)
CALCIUM SERPL-MCNC: 9.6 MG/DL (ref 8.7–10.2)
CHLORIDE SERPL-SCNC: 104 MMOL/L (ref 96–106)
CHOLEST SERPL-MCNC: 161 MG/DL (ref 100–199)
CO2 SERPL-SCNC: 20 MMOL/L (ref 20–29)
CREAT SERPL-MCNC: 0.79 MG/DL (ref 0.76–1.27)
EGFRCR SERPLBLD CKD-EPI 2021: 126 ML/MIN/1.73
ERYTHROCYTE [DISTWIDTH] IN BLOOD BY AUTOMATED COUNT: 12.8 % (ref 11.6–15.4)
GLOBULIN SER CALC-MCNC: 2.2 G/DL (ref 1.5–4.5)
GLUCOSE SERPL-MCNC: 87 MG/DL (ref 70–99)
HCT VFR BLD AUTO: 42.6 % (ref 37.5–51)
HDLC SERPL-MCNC: 36 MG/DL
HGB BLD-MCNC: 14.1 G/DL (ref 13–17.7)
HIV 1+2 AB+HIV1 P24 AG SERPL QL IA: NON REACTIVE
IMP & REVIEW OF LAB RESULTS: NORMAL
LDLC SERPL CALC-MCNC: 108 MG/DL (ref 0–99)
MCH RBC QN AUTO: 29.7 PG (ref 26.6–33)
MCHC RBC AUTO-ENTMCNC: 33.1 G/DL (ref 31.5–35.7)
MCV RBC AUTO: 90 FL (ref 79–97)
PLATELET # BLD AUTO: 247 X10E3/UL (ref 150–450)
POTASSIUM SERPL-SCNC: 4.3 MMOL/L (ref 3.5–5.2)
PROT SERPL-MCNC: 7.3 G/DL (ref 6–8.5)
RBC # BLD AUTO: 4.75 X10E6/UL (ref 4.14–5.8)
SODIUM SERPL-SCNC: 141 MMOL/L (ref 134–144)
TRIGL SERPL-MCNC: 92 MG/DL (ref 0–149)
VLDLC SERPL CALC-MCNC: 17 MG/DL (ref 5–40)
WBC # BLD AUTO: 5.9 X10E3/UL (ref 3.4–10.8)

## 2024-08-01 ENCOUNTER — OFFICE VISIT (OUTPATIENT)
Age: 29
End: 2024-08-01
Payer: COMMERCIAL

## 2024-08-01 VITALS
RESPIRATION RATE: 16 BRPM | SYSTOLIC BLOOD PRESSURE: 112 MMHG | BODY MASS INDEX: 23.1 KG/M2 | TEMPERATURE: 98.6 F | WEIGHT: 165 LBS | OXYGEN SATURATION: 98 % | DIASTOLIC BLOOD PRESSURE: 66 MMHG | HEART RATE: 58 BPM | HEIGHT: 71 IN

## 2024-08-01 DIAGNOSIS — J02.9 SORE THROAT: Primary | ICD-10-CM

## 2024-08-01 LAB
GROUP A STREP ANTIGEN, POC: NEGATIVE
INFLUENZA A ANTIGEN, POC: NEGATIVE
INFLUENZA B ANTIGEN, POC: NEGATIVE
LOT EXPIRE DATE: NORMAL
LOT KIT NUMBER: NORMAL
SARS-COV-2 RNA, POC: NEGATIVE
VALID INTERNAL CONTROL, POC: YES
VALID INTERNAL CONTROL: YES
VENDOR AND KIT NAME POC: NORMAL

## 2024-08-01 PROCEDURE — 87428 SARSCOV & INF VIR A&B AG IA: CPT | Performed by: NURSE PRACTITIONER

## 2024-08-01 PROCEDURE — 87880 STREP A ASSAY W/OPTIC: CPT | Performed by: NURSE PRACTITIONER

## 2024-08-01 PROCEDURE — 99212 OFFICE O/P EST SF 10 MIN: CPT | Performed by: NURSE PRACTITIONER

## 2024-08-01 ASSESSMENT — ENCOUNTER SYMPTOMS
EYES NEGATIVE: 1
RESPIRATORY NEGATIVE: 1
DIARRHEA: 0
SORE THROAT: 1
NAUSEA: 0
EYE REDNESS: 0
CHEST TIGHTNESS: 0
EYE PAIN: 0
RHINORRHEA: 0
COUGH: 0
ABDOMINAL PAIN: 0
SINUS PRESSURE: 0
VOMITING: 0
CONSTIPATION: 0
BACK PAIN: 0
BLOOD IN STOOL: 0
SINUS PAIN: 0
GASTROINTESTINAL NEGATIVE: 1
SHORTNESS OF BREATH: 0

## 2024-08-01 NOTE — PROGRESS NOTES
Assessment and Plan     1. Sore throat: Reassuring physical exam. Strep A, influenza and COVID-19 testing negative in office. Hand washing recommended. Recommended PO antihistamine and Advil as needed for pain. Return instructions given. Pt verbalized understanding.   -     AMB POC RAPID STREP A  -     AMB POC SARS-COV-2 AND INFLUENZA A/B     Tests Preformed During Visit:    Results for orders placed or performed in visit on 24   AMB POC RAPID STREP A   Result Value Ref Range    Valid Internal Control, POC YES     Group A Strep Antigen, POC Negative    AMB POC SARS-COV-2 AND INFLUENZA A/B   Result Value Ref Range    Internal Control YES     Lot/Kit Number [id number]     Lot/Kit  date:      SARS-COV-2 RNA, POC Negative     Influenza A Antigen, POC Negative     Influenza B Antigen, POC Negative     Vendor and kit name Anita Waldron       Benefits, risks, possible drug interactions, and side effects of all new medications were reviewed with the patient. Pt verbalized understanding.    An electronic signature was used to authenticate this note.  Anh Krishna, DENISSE - CNP  2024      Follow-up and Dispositions    Return if symptoms worsen or fail to improve.          History of Present Illness   Chief Complaint     Fan Jones is a 29 y.o. male here for had concerns including Pharyngitis (Patient reports sore throat for 5 days. No fever, chills or cough. ).   Mr. Jones presents today with reports of sore throat for 5 days. Has taken one dose of Advil, symptoms have started to improve. Denies sick contacts. History of recurrent strep A infection. Tested negative for COVID-19 at home. Denies cough, fever, chills, sinus congestion, abdominal pain, diarrhea.       Review of Systems  Review of Systems   Constitutional: Negative.  Negative for chills, fatigue, fever and unexpected weight change.   HENT:  Positive for sore throat. Negative for congestion, ear discharge, ear pain, rhinorrhea,

## 2025-01-24 ENCOUNTER — PATIENT MESSAGE (OUTPATIENT)
Facility: CLINIC | Age: 30
End: 2025-01-24

## 2025-01-31 ENCOUNTER — OFFICE VISIT (OUTPATIENT)
Facility: CLINIC | Age: 30
End: 2025-01-31
Payer: COMMERCIAL

## 2025-01-31 VITALS
HEART RATE: 65 BPM | WEIGHT: 162.6 LBS | OXYGEN SATURATION: 98 % | RESPIRATION RATE: 14 BRPM | HEIGHT: 71 IN | SYSTOLIC BLOOD PRESSURE: 135 MMHG | DIASTOLIC BLOOD PRESSURE: 82 MMHG | BODY MASS INDEX: 22.76 KG/M2

## 2025-01-31 DIAGNOSIS — G89.29 CHRONIC MIDLINE LOW BACK PAIN WITH RIGHT-SIDED SCIATICA: Primary | ICD-10-CM

## 2025-01-31 DIAGNOSIS — M54.41 CHRONIC MIDLINE LOW BACK PAIN WITH RIGHT-SIDED SCIATICA: Primary | ICD-10-CM

## 2025-01-31 PROCEDURE — 99213 OFFICE O/P EST LOW 20 MIN: CPT | Performed by: INTERNAL MEDICINE

## 2025-01-31 ASSESSMENT — PATIENT HEALTH QUESTIONNAIRE - PHQ9
SUM OF ALL RESPONSES TO PHQ QUESTIONS 1-9: 0
SUM OF ALL RESPONSES TO PHQ9 QUESTIONS 1 & 2: 0
2. FEELING DOWN, DEPRESSED OR HOPELESS: NOT AT ALL
1. LITTLE INTEREST OR PLEASURE IN DOING THINGS: NOT AT ALL
SUM OF ALL RESPONSES TO PHQ QUESTIONS 1-9: 0

## 2025-01-31 NOTE — PROGRESS NOTES
HISTORY OF PRESENT ILLNESS    Chief Complaint   Patient presents with    Back Pain       History of Present Illness  The patient is a 29-year-old male who presents with lower back pain.    He has a documented history of back pain, with significant episodes occurring in 2016 and 2018. In 2016, he sustained a back injury while performing deadlifts during his college years. Despite the injury, he continued with the exercise, which led to a severe exacerbation of his back pain. He sought medical attention from a specialist who suggested surgical intervention, but he declined due to his young age. Instead, he opted for physical therapy, which provided substantial relief but did not completely resolve the issue. Since then, he has been unable to engage in activities that involve lifting or bending, such as yard work or heavy lifting, due to the risk of re-injuring his back. Recently, he has been focusing on fitness and has attempted to strengthen his lower back muscles through deadlifting. However, he reports that he did not adequately rebuild his back muscles following the initial injury and subsequent physical therapy, leading to recurrent injuries when attempting to lift objects. He describes a cycle of overexertion, resulting in back pain, followed by periods of rest and subsequent attempts to resume his fitness regimen. His current pain is localized to the lower middle back, with occasional radiation down his right leg, most recently experienced 2 weeks ago. He is seeking a long-term management strategy for his chronic back pain and is open to resuming physical therapy and core strengthening exercises. He has not required any analgesics such as Tylenol, ibuprofen, or Aleve for pain management. In 2018, he experienced left leg weakness or pain radiating down his left leg and consulted Dr. Barbosa. An x-ray conducted in January 2018 was reported as \"benign\" per chart. He followed up with Dr. Barbosa in July 2018, after an

## 2025-03-05 ENCOUNTER — OFFICE VISIT (OUTPATIENT)
Facility: CLINIC | Age: 30
End: 2025-03-05
Payer: COMMERCIAL

## 2025-03-05 VITALS
OXYGEN SATURATION: 99 % | BODY MASS INDEX: 22.12 KG/M2 | WEIGHT: 158 LBS | SYSTOLIC BLOOD PRESSURE: 118 MMHG | HEART RATE: 68 BPM | DIASTOLIC BLOOD PRESSURE: 70 MMHG | TEMPERATURE: 98.4 F | RESPIRATION RATE: 16 BRPM | HEIGHT: 71 IN

## 2025-03-05 DIAGNOSIS — B37.2 CANDIDA INFECTION OF FLEXURAL SKIN: Primary | ICD-10-CM

## 2025-03-05 PROCEDURE — 99212 OFFICE O/P EST SF 10 MIN: CPT | Performed by: NURSE PRACTITIONER

## 2025-03-05 RX ORDER — NYSTATIN 100000 U/G
OINTMENT TOPICAL
Qty: 30 G | Refills: 0 | Status: SHIPPED | OUTPATIENT
Start: 2025-03-05

## 2025-03-05 ASSESSMENT — ENCOUNTER SYMPTOMS
BLOOD IN STOOL: 0
GASTROINTESTINAL NEGATIVE: 1
BACK PAIN: 0
ABDOMINAL PAIN: 0
RHINORRHEA: 0
NAUSEA: 0
CHEST TIGHTNESS: 0
SINUS PRESSURE: 0
EYES NEGATIVE: 1
SINUS PAIN: 0
CONSTIPATION: 0
RESPIRATORY NEGATIVE: 1
COLOR CHANGE: 1
VOMITING: 0
SHORTNESS OF BREATH: 0
COUGH: 0
EYE REDNESS: 0
EYE PAIN: 0
DIARRHEA: 0

## 2025-03-05 NOTE — PROGRESS NOTES
Assessment and Plan     1. Candida infection of flexural skin: Recommended to keep area dry. Rx for Nystatin ointment given, mode of use discussed. Pt agreed with plan  -     nystatin (MYCOSTATIN) 134763 UNIT/GM ointment; Apply topically 2 times daily., Disp-30 g, R-0, Normal       Benefits, risks, possible drug interactions, and side effects of all new medications were reviewed with the patient. Pt verbalized understanding.    An electronic signature was used to authenticate this note.  Anh Krishna, APRN - CNP  3/5/2025      Follow-up and Dispositions    Return if symptoms worsen or fail to improve.          History of Present Illness   Chief Complaint     Fan Jones is a 29 y.o. male here for had concerns including Rash (Rash on the right armpit for 1 week).   Mr. Jones presents today with reports of rash and itchiness to R armpit for a week. Has not treated rash with OTC. Admits symptoms are improving. Denies using new soaps or detergents.       Review of Systems  Review of Systems   Constitutional: Negative.  Negative for chills, fatigue, fever and unexpected weight change.   HENT: Negative.  Negative for congestion, rhinorrhea, sinus pressure and sinus pain.    Eyes: Negative.  Negative for pain, redness and visual disturbance.   Respiratory: Negative.  Negative for cough, chest tightness and shortness of breath.    Cardiovascular: Negative.  Negative for chest pain and palpitations.   Gastrointestinal: Negative.  Negative for abdominal pain, blood in stool, constipation, diarrhea, nausea and vomiting.   Endocrine: Negative.  Negative for polydipsia, polyphagia and polyuria.   Genitourinary: Negative.  Negative for difficulty urinating, dysuria, frequency and urgency.   Musculoskeletal: Negative.  Negative for back pain.   Skin:  Positive for color change and rash (R armpit). Negative for wound.   Neurological: Negative.  Negative for dizziness, light-headedness and headaches.

## 2025-06-25 ENCOUNTER — OFFICE VISIT (OUTPATIENT)
Age: 30
End: 2025-06-25

## 2025-06-25 VITALS
DIASTOLIC BLOOD PRESSURE: 65 MMHG | BODY MASS INDEX: 24.22 KG/M2 | WEIGHT: 169.2 LBS | SYSTOLIC BLOOD PRESSURE: 110 MMHG | HEIGHT: 70 IN | RESPIRATION RATE: 13 BRPM | TEMPERATURE: 98.1 F | OXYGEN SATURATION: 97 % | HEART RATE: 72 BPM

## 2025-06-25 DIAGNOSIS — K12.0 APHTHOUS ULCER: Primary | ICD-10-CM

## 2025-06-25 RX ORDER — LIDOCAINE HYDROCHLORIDE 20 MG/ML
15 SOLUTION OROPHARYNGEAL PRN
Qty: 100 ML | Refills: 0 | Status: SHIPPED | OUTPATIENT
Start: 2025-06-25

## 2025-06-25 ASSESSMENT — ENCOUNTER SYMPTOMS
GASTROINTESTINAL NEGATIVE: 1
SHORTNESS OF BREATH: 0
TROUBLE SWALLOWING: 0
SORE THROAT: 0
COUGH: 0
RHINORRHEA: 0

## 2025-06-25 NOTE — PROGRESS NOTES
SUBJECTIVE/OBJECTIVE:    Limitation to History: None    Outside Historian: None    External Records Reviewed: None    SUBJECTIVE/OBJECTIVE:  Fan Jones is a 30 y.o. male presents with complaint of canker sore.  Symptoms began 7 day(s) ago and are worsening since onset.  The patient denies fever, uri symptoms or sore throat. Patient reports taking none for symptoms  relief. No other acute symptoms reported at this time.       30 yr old male c/o 1 week of painful canker sore inside his mouth. Has had before, but never this painful. Makes it hard to eat or even swallow. No fever/chills, uri symptoms.      Pharyngitis  Associated symptoms: no congestion, no cough, no fatigue, no fever, no rash, no rhinorrhea, no shortness of breath and no sore throat           Pharyngitis (Pt c/o sore throat, sx onset a week ago. Pt denies taking any medications for sx. )      No results found for any visits on 06/25/25.    No results found for this visit on 06/25/25.  XR Results (most recent):  @[facility]([id number])@     The results of the diagnostic studies have been independently interpreted by myself: All studies will be over-read by Radiologist and patient will be called if any changes to treatment or diagnosis based on final read.     Review of Systems   Constitutional:  Negative for chills, fatigue, fever and unexpected weight change.   HENT:  Positive for mouth sores. Negative for congestion, postnasal drip, rhinorrhea, sore throat and trouble swallowing.    Respiratory:  Negative for cough and shortness of breath.    Cardiovascular: Negative.    Gastrointestinal: Negative.    Skin:  Negative for rash and wound.         Physical Exam  Constitutional:       General: He is not in acute distress.     Appearance: Normal appearance.   HENT:      Head: Normocephalic.      Mouth/Throat:      Mouth: Mucous membranes are moist.      Comments: L tonsilar pillar w 8mm canker sore  Cardiovascular:      Rate and Rhythm: Normal rate

## 2025-08-25 ENCOUNTER — OFFICE VISIT (OUTPATIENT)
Facility: CLINIC | Age: 30
End: 2025-08-25
Payer: COMMERCIAL

## 2025-08-25 VITALS
RESPIRATION RATE: 16 BRPM | WEIGHT: 174.2 LBS | BODY MASS INDEX: 24.39 KG/M2 | SYSTOLIC BLOOD PRESSURE: 106 MMHG | OXYGEN SATURATION: 99 % | TEMPERATURE: 98.1 F | HEART RATE: 57 BPM | HEIGHT: 71 IN | DIASTOLIC BLOOD PRESSURE: 66 MMHG

## 2025-08-25 DIAGNOSIS — B37.2 CANDIDA INFECTION OF FLEXURAL SKIN: Primary | ICD-10-CM

## 2025-08-25 PROCEDURE — 99213 OFFICE O/P EST LOW 20 MIN: CPT | Performed by: NURSE PRACTITIONER

## 2025-08-25 RX ORDER — NYSTATIN 100000 U/G
OINTMENT TOPICAL
Qty: 30 G | Refills: 0 | Status: SHIPPED | OUTPATIENT
Start: 2025-08-25

## 2025-08-25 RX ORDER — FLUCONAZOLE 150 MG/1
150 TABLET ORAL
Qty: 2 TABLET | Refills: 0 | Status: SHIPPED | OUTPATIENT
Start: 2025-08-25 | End: 2025-08-31

## 2025-08-25 SDOH — ECONOMIC STABILITY: FOOD INSECURITY: WITHIN THE PAST 12 MONTHS, THE FOOD YOU BOUGHT JUST DIDN'T LAST AND YOU DIDN'T HAVE MONEY TO GET MORE.: NEVER TRUE

## 2025-08-25 SDOH — ECONOMIC STABILITY: FOOD INSECURITY: WITHIN THE PAST 12 MONTHS, YOU WORRIED THAT YOUR FOOD WOULD RUN OUT BEFORE YOU GOT MONEY TO BUY MORE.: NEVER TRUE

## 2025-08-25 ASSESSMENT — ENCOUNTER SYMPTOMS
GASTROINTESTINAL NEGATIVE: 1
ABDOMINAL PAIN: 0
SINUS PAIN: 0
COUGH: 0
NAUSEA: 0
DIARRHEA: 0
VOMITING: 0
CHEST TIGHTNESS: 0
EYE REDNESS: 0
RESPIRATORY NEGATIVE: 1
SHORTNESS OF BREATH: 0
BLOOD IN STOOL: 0
COLOR CHANGE: 0
RHINORRHEA: 0
EYE PAIN: 0
BACK PAIN: 0
SINUS PRESSURE: 0
EYES NEGATIVE: 1
CONSTIPATION: 0